# Patient Record
(demographics unavailable — no encounter records)

---

## 2024-10-24 NOTE — DISCUSSION/SUMMARY
[FreeTextEntry1] : 51 year old F with PMHx of IBS and HLD who presents today for follow-up.  Dyspnea on exertion:  - EKG today: NSR @ 74 bpm  - Last echo (08/2022) revealed minimal mitral regurgitation, normal LVSF with EF 60% and minimal tricuspid regurgitation - Will repeat echo at follow-up  HLD/ASCVD risk reduction: low ASCVD risk <1% - Last LDL (07/2020) elevated at 131 above goal LDL < 100 - will repeat lipid panel today - Last A1c (07/2020) 4.9% - will repeat today - Encouraged patient to continue healthy exercise and eating habits, focusing on a Mediterranean style of eating and aiming for the recommended 150 minutes per week of moderate physical activity  - Will refer to nutritionist Portia Flower for support   Pt. to RTC for echocardiogram. Will contact her with lab work.

## 2024-10-24 NOTE — REASON FOR VISIT
[Hyperlipidemia] : hyperlipidemia [FreeTextEntry1] : 51 year old F with PMHx of IBS and HLD who presents today for follow-up.

## 2024-10-24 NOTE — HISTORY OF PRESENT ILLNESS
[FreeTextEntry1] : Since her last visit, patient is feeling well overall. She has had no reoccurrence of chest discomfort since her  visit and attributes it to stress.  Activity: She walks regularly but plans to join a gym in January. She has gained some weight and is bothered by it. No exertional chest pain. She admits to some dyspnea on exertion when climbing stairs. She lives on the 5th floor and used to be able to climb to the top with no issue. Now she finds she is getting short of breath after the second flight, requiring her to stop and catch her breath.  Pt. denies any orthopnea, PND, palpitations, lightheadedness, syncope or lower extremity edema.   She takes Tylenol PRN for migraines. She also has a hx of vertigo.  She is pre-menopausal. No HRT ========================================= Labs/Diagnostics:  Dec 2022 lipids  HDL 72  , a1c 5.0% 2020 Lipid profile: T, TC: 222, HDL: 72, LDL: 131 A1c: 4.9%  Stress EKG (2020): normal   Echo (2022): minimal mitral regurgitation, normal LVSF with EF 60%, minimal tricuspid regurgitation

## 2024-10-25 NOTE — HISTORY OF PRESENT ILLNESS
[FreeTextEntry1] : Pt. is a 50 year old F with PMHx of IBS and HLD who presents today for follow-up.  Since her last visit, patient is feeling well overall. She has had no reoccurrence of chest discomfort since her  visit and attributes it to stress. She states she is sob when she goes up the stairs. Has gained 11lbs since she was last seen in Dec 2022. She is not currently working out, just walks a lot  Activity: She walks regularly. No exertional chest pain. She admits to some dyspnea on exertion when climbing stairs. She lives on the 5th floor and used to be able to climb to the top with no issue. Now she finds she is getting short of breath after the second flight, requiring her to stop and catch her breath.  Diet: ground turkey, salad, sweet potatoes, toast, hard boiled eggs, oats, salmon, and tuna.  States everything else makes her sick due to her IBS Pt. denies any orthopnea, PND, palpitations, lightheadedness, syncope or lower extremity edema.    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  Currently in menopause last period a year ago. No HRT ========================================= Labs/Diagnostics:  Dec 2022 lipids  HDL 72  , a1c 5.0% 2020 Lipid profile: T, TC: 222, HDL: 72, LDL: 131 A1c: 4.9%  Stress EKG (2020): normal   Echo (2022): minimal mitral regurgitation, normal LVSF with EF 60%, minimal tricuspid regurgitation

## 2024-10-25 NOTE — END OF VISIT
[FreeTextEntry3] : Patient seen and examined. Case discussed with nurse practitioner. Agree with plan as detailed above.  [Time Spent: ___ minutes] : I have spent [unfilled] minutes of time on the encounter which excludes teaching and separately reported services.

## 2024-10-25 NOTE — REVIEW OF SYSTEMS
[Dyspnea on exertion] : dyspnea during exertion [Negative] : Genitourinary [Fever] : no fever [Chills] : no chills [SOB] : no shortness of breath [Chest Discomfort] : no chest discomfort [Lower Ext Edema] : no extremity edema [Leg Claudication] : no intermittent leg claudication [Palpitations] : no palpitations [Orthopnea] : no orthopnea [PND] : no PND [Syncope] : no syncope [Cough] : no cough [Abdominal Pain] : no abdominal pain [Dizziness] : no dizziness

## 2024-10-25 NOTE — DISCUSSION/SUMMARY
[FreeTextEntry1] : Pt. is a 50 year old F with PMHx of IBS and HLD who presents today for follow-up.  Dyspnea on exertion:  - EKG today - ECHO yesterday  (08/2022) revealed minimal mitral regurgitation, normal LVSF with EF 60% and minimal tricuspid regurgitation -follow up CCTA  - will review results over telemedicine in 1 month   HLD/ASCVD risk reduction: low ASCVD risk <1% - Last LDL (07/2020) elevated at 118 above goal LDL < 100 - will repeat lipid panel today - Last A1c (07/2020) 5.0% - will repeat today - Encouraged patient to continue healthy exercise and eating habits, focusing on a Mediterranean style of eating and aiming for the recommended 150 minutes per week of moderate physical activity  - follow up Lipoprotein A, Lipid panel, A1c  Telemedicine in 1 month. And in person follow up in 6 months. Will contact her with lab work. [EKG obtained to assist in diagnosis and management of assessed problem(s)] : EKG obtained to assist in diagnosis and management of assessed problem(s)

## 2024-11-05 NOTE — HISTORY OF PRESENT ILLNESS
[FreeTextEntry1] : 52 y/o patient present for pre op  LMP 8/2023, periods were always light Beginning of 2024, had mild spotting Knew h/o fibroids

## 2025-01-02 NOTE — ASSESSMENT
[FreeTextEntry1] : #Skin cancer screening  Sun protection reviewed. The patient was educated regarding appropriate sun protection measures, including wearing sunscreen with SPF 30 or higher when outdoors, sun protective clothing, and sun avoidance.   #Benign appearing nevi #Solar lentigines #Seborrheic keratosis- including R inframammary fold, R lateral orbital skin, R conchal bowl- Patient was reassured of the benign nature of these findings. No further treatment needed at this time. If any lesion changes or becomes symptomatic I recommend follow-up  #Macular SK- R cheek  x 15 years, reportedly w/o change baseline photo today follow up for any changes  #Chronic hand dermatitis -aquaphor daily -start tac 0.1 oint bid for two weeks on and two weeks off as needed for itching. -rtc if no improvement after 6-8 weeks  RTC 1 year or sooner prn

## 2025-01-02 NOTE — HISTORY OF PRESENT ILLNESS
[FreeTextEntry1] : NPV-FBSE [de-identified] : Rocio Pozo 53 y/o F presents for FBSE.  -new brown spots on breast/R conchal bowl /next to R eye  -recurrent skin dryness and irritation on her thumbs. strong family hx eczema. Denies involvement anywhere else including eyelids and behind ears  Personal history of skin cancer: NO Family history of skin cancer: NO History of blistering sunburns: NO History of tanning bed use: NO Uses sunscreen regularly:NO

## 2025-01-02 NOTE — PHYSICAL EXAM
[FreeTextEntry3] : Exam of external genitalia was deferred.  -On the trunk and upper/lower extremities bilaterally, are multiple scattered tan to brown macules and papules with regular borders. Some of these were examined dermoscopically and had reassuring features. -Scattered tan smooth macules with regular borders and pigmentation. Many of these were examined with dermoscopy and had benign features. -Including R inframammary fold, R lateral orbital skin, R conchal bowl- there are Scattered tan waxy/keratotic, stuck-on appearing papules/plaques with pseudohorn cysts.  With dermoscopy, milia-like cysts and comedo-like openings are noted. -thumbs: ill defined erythematous scaling plaques and fissure -R cheek: 1.5cm light brown homogenous circumscribed macule, no pigment network on dermoscopy

## 2025-04-24 NOTE — HISTORY OF PRESENT ILLNESS
[FreeTextEntry1] : 52 year old F with PMHx of IBS and HLD who presents today for follow-up.  She has not been exercising regularly and having a hard time with weight loss.  She has started to eat less eggs, less oil and more chicken instead of red overall.   Since her last visit, patient is feeling well overall.  No SOB, chest pain, CROW, orthopnea, PND.   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  Currently in menopause last period a year ago. No HRT ========================================= Labs/Diagnostics:  Oct 2024 lipids  HDL 71  ; a1c 5.4%; Lp(a) 45.9 nmol/L Dec 2022 lipids  HDL 72  , a1c 5.0% 2020 Lipid profile: T, TC: 222, HDL: 72, LDL: 131 A1c: 4.9%  2024 CCTA - The calcium score is 0 Agatston units. Normal coronary arteries Oct 2024 TTE - nl LV & RV size & fx, mild MR, no pericardial effusion  Echo (2022): minimal mitral regurgitation, normal LVSF with EF 60%, minimal tricuspid regurgitation

## 2025-04-24 NOTE — DISCUSSION/SUMMARY
[EKG obtained to assist in diagnosis and management of assessed problem(s)] : EKG obtained to assist in diagnosis and management of assessed problem(s) [FreeTextEntry1] : 52 year old F with PMHx of IBS and HLD who presents today for follow-up.  HLD/ASCVD risk reduction: low ASCVD risk <1% goal LDL < 100. CCTA in Oct 2024 with normal cors. Lpa normal.  - Encouraged patient to continue healthy exercise and eating habits, focusing on a Mediterranean style of eating and aiming for the recommended 150 minutes per week of moderate physical activity.   RTC 12 months for in person follow up with Dr. Manuel.  23-Sep-2022 00:00

## 2025-05-15 NOTE — ASSESSMENT
[FreeTextEntry1] : #Chronic hand dermatitis -flaring #new dermatitis on chest -aquaphor daily -stop TAC 0.1 oint -Start opzelura cream bid to AA on hands. OK to use on active rash on chest too -recommend patch testing. suggest Dr Blochin, dermspecs, [facility], [place]. She will call to make appt and confirm her insurance is accepted.  -stop manicures, avoid pushing or cutting cuticles.  -rtc 2-3 month  #Longitudinal melanonychia - 2 fingernails (R1 R3) Clinically, favor benign ie melanocytic activation. May be related to chronic active hand dermatitis and trauma to nailfolds.   #Skin laxity - neck  recommend surgical intervention   #Dark circles- skin laxity continue retinol eyecream recommend surgical intervention other non surgical treatment modalities include microneedling, laser resurfacing. will need multiple tx sessions  #Habic tic deformity - thumbs Not addressed today, can address in future  RTC 2-3 mo

## 2025-05-15 NOTE — HISTORY OF PRESENT ILLNESS
[FreeTextEntry1] : RPA - Line on Nail  [de-identified] : Rocio Pozo 53 y/o F presents for black line on right thumb, new line forming on right 3rd finger, and rough patch on chest. #stripe on R thumb x 1 year, noticed new one on 3rd finger. Getting manicures regularly.  #Hand eczema flaring. TAC works only when using, then skin flares again. washes frequently and sanitizes frequently. Works in [place].  #Rough patch middle of chest, started after using dove body deodorant spray #undereye bags, circles. using Juventino.  #loose skin under neck ---------------------- LV: 01/02/2025 Rocio Pozo 53 y/o F presents for FBSE.  -new brown spots on breast/R conchal bowl /next to R eye  -recurrent skin dryness and irritation on her thumbs. strong family hx eczema. Denies involvement anywhere else including eyelids and behind ears  Personal history of skin cancer: NO Family history of skin cancer: NO History of blistering sunburns: NO History of tanning bed use: NO Uses sunscreen regularly:NO

## 2025-05-15 NOTE — PHYSICAL EXAM
[FreeTextEntry3] : hands, prashant distal fingers: scaling erythematous ill defined patches w/ fissuring   loss of most fingernail cuticles   b/l thumbs: central short longitudinal grooves  R 1st nailplate, R 3rd: narrow homogenously light grey stripes, tapers out towards proximal nailfold   lower eyelids: hallowing, skin laxity . has concealer on  neck: skin laxity

## 2025-05-15 NOTE — ASSESSMENT
[FreeTextEntry1] : #Chronic hand dermatitis -flaring #new dermatitis on chest -aquaphor daily -stop TAC 0.1 oint -Start opzelura cream bid to AA on hands. OK to use on active rash on chest too -recommend patch testing. suggest Dr Blochin, dermspecs, [facility], [place]. She will call to make appt and confirm her insurance is accepted.  -stop manicures, avoid pushing or cutting cuticles.  -rtc 2-3 month  #Longitudinal melanonychia - 2 fingernails (R1 R3) Clinically, favor benign ie melanocytic activation. May be related to chronic active hand dermatitis and trauma to nailfolds.   #Skin laxity - neck  recommend surgical intervention   #Dark circles- skin laxity continue retinol eyecream recommend surgical intervention other non surgical treatment modalities include microneedling, laser resurfacing. will need multiple tx sessions  #Habic tic deformity - thumbs Not addressed today, can address in future  RTC 2-3 mo

## 2025-05-15 NOTE — HISTORY OF PRESENT ILLNESS
[FreeTextEntry1] : RPA - Line on Nail  [de-identified] : Rocio Pozo 51 y/o F presents for black line on right thumb, new line forming on right 3rd finger, and rough patch on chest. #stripe on R thumb x 1 year, noticed new one on 3rd finger. Getting manicures regularly.  #Hand eczema flaring. TAC works only when using, then skin flares again. washes frequently and sanitizes frequently. Works in [place].  #Rough patch middle of chest, started after using dove body deodorant spray #undereye bags, circles. using Juventino.  #loose skin under neck ---------------------- LV: 01/02/2025 Rocio Pozo 51 y/o F presents for FBSE.  -new brown spots on breast/R conchal bowl /next to R eye  -recurrent skin dryness and irritation on her thumbs. strong family hx eczema. Denies involvement anywhere else including eyelids and behind ears  Personal history of skin cancer: NO Family history of skin cancer: NO History of blistering sunburns: NO History of tanning bed use: NO Uses sunscreen regularly:NO

## 2025-05-30 NOTE — PLAN
[FreeTextEntry1] : annual; pap and hpv  will f/u with Dr Goldstein : had D and C polyp with some features to f/u breast imaging in october TV sono to start

## 2025-05-30 NOTE — HISTORY OF PRESENT ILLNESS
[Patient reported mammogram was normal] : Patient reported mammogram was normal [Patient reported breast sonogram was normal] : Patient reported breast sonogram was normal [Patient reported colonoscopy was normal] : Patient reported colonoscopy was normal [FreeTextEntry1] : Rocio Pozo presents for well woman visit with gyn exam.  [Mammogramdate] : 10/2024 [BreastSonogramDate] : 10/2024

## 2025-06-06 NOTE — HISTORY OF PRESENT ILLNESS
[FreeTextEntry1] : 53 y/o pt presents in office today for a follow up visit. [No] : Patient does not have concerns regarding sex